# Patient Record
Sex: FEMALE | Race: WHITE | NOT HISPANIC OR LATINO | Employment: OTHER | URBAN - METROPOLITAN AREA
[De-identification: names, ages, dates, MRNs, and addresses within clinical notes are randomized per-mention and may not be internally consistent; named-entity substitution may affect disease eponyms.]

---

## 2019-07-09 NOTE — PATIENT DISCUSSION
The patient was informed that with the Basic + option, they will most likely need prescription glasses at all focal points after surgery. The patient elects Basic + O*, goal of emmetropia.

## 2019-08-12 ENCOUNTER — NEW PATIENT COMPREHENSIVE (OUTPATIENT)
Dept: URBAN - METROPOLITAN AREA CLINIC 38 | Facility: CLINIC | Age: 24
End: 2019-08-12

## 2019-08-12 DIAGNOSIS — H52.203: ICD-10-CM

## 2019-08-12 DIAGNOSIS — H52.13: ICD-10-CM

## 2019-08-12 PROCEDURE — 92310-2 LEVEL 2 CONTACT LENS MANAGEMENT

## 2019-08-12 PROCEDURE — 92015 DETERMINE REFRACTIVE STATE: CPT

## 2019-08-12 PROCEDURE — 92004 COMPRE OPH EXAM NEW PT 1/>: CPT

## 2019-08-12 ASSESSMENT — TONOMETRY
OD_IOP_MMHG: 16
OS_IOP_MMHG: 15

## 2019-08-12 ASSESSMENT — VISUAL ACUITY
OS_SC: 20/400
OD_SC: 20/400
OS_CC: 20/25
OD_CC: J1
OS_CC: J1
OD_CC: 20/20

## 2019-08-12 ASSESSMENT — KERATOMETRY
OS_K2POWER_DIOPTERS: 45
OD_K2POWER_DIOPTERS: 44.75
OD_K1POWER_DIOPTERS: 43
OS_AXISANGLE_DEGREES: 171
OD_AXISANGLE_DEGREES: 1
OS_K1POWER_DIOPTERS: 43
OS_AXISANGLE2_DEGREES: 81
OD_AXISANGLE2_DEGREES: 91

## 2019-08-22 ENCOUNTER — CONTACT LENS FOLLOW UP (OUTPATIENT)
Dept: URBAN - METROPOLITAN AREA CLINIC 38 | Facility: CLINIC | Age: 24
End: 2019-08-22

## 2019-08-22 DIAGNOSIS — Z46.0: ICD-10-CM

## 2019-08-22 PROCEDURE — 92310F

## 2019-08-22 ASSESSMENT — KERATOMETRY
OD_AXISANGLE_DEGREES: 1
OD_K2POWER_DIOPTERS: 44.75
OS_AXISANGLE2_DEGREES: 81
OD_AXISANGLE2_DEGREES: 91
OS_K2POWER_DIOPTERS: 45
OS_AXISANGLE_DEGREES: 171
OS_K1POWER_DIOPTERS: 43
OD_K1POWER_DIOPTERS: 43

## 2019-08-22 ASSESSMENT — VISUAL ACUITY
OS_CC: 20/20
OD_CC: J1
OU_CC: 20/20
OU_CC: J1
OD_CC: 20/20
OS_CC: J1

## 2020-07-01 ENCOUNTER — CONTACT LENS EXAM ESTABLISHED (OUTPATIENT)
Dept: URBAN - METROPOLITAN AREA CLINIC 38 | Facility: CLINIC | Age: 25
End: 2020-07-01

## 2020-07-01 DIAGNOSIS — H52.203: ICD-10-CM

## 2020-07-01 DIAGNOSIS — H52.13: ICD-10-CM

## 2020-07-01 DIAGNOSIS — Z97.3: ICD-10-CM

## 2020-07-01 PROCEDURE — 92014 COMPRE OPH EXAM EST PT 1/>: CPT

## 2020-07-01 PROCEDURE — 92310-2 LEVEL 2 CONTACT LENS MANAGEMENT

## 2020-07-01 PROCEDURE — 92015 DETERMINE REFRACTIVE STATE: CPT

## 2020-07-01 RX ORDER — OLOPATADINE HYDROCHLORIDE 2 MG/ML: 1 SOLUTION OPHTHALMIC ONCE A DAY

## 2020-07-01 ASSESSMENT — KERATOMETRY
OS_K1POWER_DIOPTERS: 43
OD_K2POWER_DIOPTERS: 44.75
OD_K1POWER_DIOPTERS: 43
OD_AXISANGLE_DEGREES: 1
OS_AXISANGLE_DEGREES: 171
OS_K2POWER_DIOPTERS: 45
OS_AXISANGLE2_DEGREES: 81
OD_AXISANGLE2_DEGREES: 91

## 2020-07-01 ASSESSMENT — VISUAL ACUITY
OS_CC: J1
OS_CC: 20/20
OD_CC: J1
OD_CC: 20/20

## 2020-07-01 ASSESSMENT — TONOMETRY
OD_IOP_MMHG: 15
OS_IOP_MMHG: 16

## 2020-07-15 ENCOUNTER — CONTACT LENS FOLLOW UP (OUTPATIENT)
Dept: URBAN - METROPOLITAN AREA CLINIC 38 | Facility: CLINIC | Age: 25
End: 2020-07-15

## 2020-07-15 DIAGNOSIS — Z97.3: ICD-10-CM

## 2020-07-15 PROCEDURE — 92310F

## 2020-07-15 ASSESSMENT — KERATOMETRY
OD_AXISANGLE_DEGREES: 1
OS_AXISANGLE_DEGREES: 171
OD_AXISANGLE2_DEGREES: 91
OS_AXISANGLE2_DEGREES: 81
OS_K2POWER_DIOPTERS: 45
OD_K1POWER_DIOPTERS: 43
OD_K2POWER_DIOPTERS: 44.75
OS_K1POWER_DIOPTERS: 43

## 2020-07-15 ASSESSMENT — VISUAL ACUITY
OD_CC: J1
OS_CC: J1
OD_CC: 20/20
OS_CC: 20/20

## 2020-10-07 ENCOUNTER — EST. PATIENT EMERGENCY (OUTPATIENT)
Dept: URBAN - METROPOLITAN AREA CLINIC 38 | Facility: CLINIC | Age: 25
End: 2020-10-07

## 2020-10-07 DIAGNOSIS — S05.02XA: ICD-10-CM

## 2020-10-07 PROCEDURE — 92012 INTRM OPH EXAM EST PATIENT: CPT

## 2020-10-07 PROCEDURE — 92071 CONTACT LENS FITTING FOR TX: CPT

## 2020-10-07 RX ORDER — MOXIFLOXACIN HYDROCHLORIDE 5 MG/ML
1 SOLUTION/ DROPS OPHTHALMIC
Start: 2020-10-07

## 2020-10-07 ASSESSMENT — KERATOMETRY
OD_AXISANGLE_DEGREES: 1
OS_K1POWER_DIOPTERS: 43
OD_K1POWER_DIOPTERS: 43
OS_AXISANGLE_DEGREES: 171
OS_K2POWER_DIOPTERS: 45
OS_AXISANGLE2_DEGREES: 81
OD_K2POWER_DIOPTERS: 44.75
OD_AXISANGLE2_DEGREES: 91

## 2020-10-07 ASSESSMENT — VISUAL ACUITY
OD_CC: 20/20
OS_CC: 20/40

## 2020-10-07 ASSESSMENT — TONOMETRY: OD_IOP_MMHG: 17

## 2020-10-09 ENCOUNTER — FOLLOW UP (OUTPATIENT)
Dept: URBAN - METROPOLITAN AREA CLINIC 38 | Facility: CLINIC | Age: 25
End: 2020-10-09

## 2020-10-09 DIAGNOSIS — B00.52: ICD-10-CM

## 2020-10-09 PROCEDURE — 92012 INTRM OPH EXAM EST PATIENT: CPT

## 2020-10-09 RX ORDER — VALACYCLOVIR HYDROCHLORIDE 500 MG/1: 1 TABLET, FILM COATED ORAL

## 2020-10-09 ASSESSMENT — KERATOMETRY
OD_K1POWER_DIOPTERS: 43
OS_K1POWER_DIOPTERS: 43
OD_K2POWER_DIOPTERS: 44.75
OS_AXISANGLE_DEGREES: 171
OD_AXISANGLE2_DEGREES: 91
OS_AXISANGLE2_DEGREES: 81
OS_K2POWER_DIOPTERS: 45
OD_AXISANGLE_DEGREES: 1

## 2020-10-09 ASSESSMENT — VISUAL ACUITY
OS_CC: 20/30
OD_CC: 20/20

## 2020-10-19 ENCOUNTER — FOLLOW UP (OUTPATIENT)
Dept: URBAN - METROPOLITAN AREA CLINIC 38 | Facility: CLINIC | Age: 25
End: 2020-10-19

## 2020-10-19 DIAGNOSIS — B00.52: ICD-10-CM

## 2020-10-19 PROCEDURE — 99212 OFFICE O/P EST SF 10 MIN: CPT

## 2020-10-19 ASSESSMENT — KERATOMETRY
OD_AXISANGLE_DEGREES: 1
OD_K2POWER_DIOPTERS: 44.75
OS_K1POWER_DIOPTERS: 43
OS_AXISANGLE_DEGREES: 171
OD_K1POWER_DIOPTERS: 43
OS_AXISANGLE2_DEGREES: 81
OD_AXISANGLE2_DEGREES: 91
OS_K2POWER_DIOPTERS: 45

## 2020-10-19 ASSESSMENT — VISUAL ACUITY
OD_CC: 20/20
OS_CC: 20/25-1

## 2020-10-19 ASSESSMENT — TONOMETRY
OS_IOP_MMHG: 17
OD_IOP_MMHG: 16

## 2021-07-16 ENCOUNTER — CONTACT LENS EXAM ESTABLISHED (OUTPATIENT)
Dept: URBAN - METROPOLITAN AREA CLINIC 38 | Facility: CLINIC | Age: 26
End: 2021-07-16

## 2021-07-16 DIAGNOSIS — H52.13: ICD-10-CM

## 2021-07-16 DIAGNOSIS — Z97.3: ICD-10-CM

## 2021-07-16 DIAGNOSIS — H52.203: ICD-10-CM

## 2021-07-16 PROCEDURE — 92015 DETERMINE REFRACTIVE STATE: CPT

## 2021-07-16 PROCEDURE — 92310-2 LEVEL 2 CONTACT LENS MANAGEMENT

## 2021-07-16 PROCEDURE — 92014 COMPRE OPH EXAM EST PT 1/>: CPT

## 2021-07-16 ASSESSMENT — VISUAL ACUITY
OS_CC: J1
OD_CC: 20/20
OD_CC: J1
OS_CC: 20/20

## 2021-07-16 ASSESSMENT — KERATOMETRY
OD_AXISANGLE2_DEGREES: 91
OD_K1POWER_DIOPTERS: 43
OS_K1POWER_DIOPTERS: 43
OS_AXISANGLE2_DEGREES: 81
OD_K2POWER_DIOPTERS: 44.75
OD_AXISANGLE_DEGREES: 1
OS_AXISANGLE_DEGREES: 171
OS_K2POWER_DIOPTERS: 45

## 2021-07-16 ASSESSMENT — TONOMETRY
OD_IOP_MMHG: 14
OS_IOP_MMHG: 14